# Patient Record
Sex: FEMALE | Race: WHITE | URBAN - METROPOLITAN AREA
[De-identification: names, ages, dates, MRNs, and addresses within clinical notes are randomized per-mention and may not be internally consistent; named-entity substitution may affect disease eponyms.]

---

## 2018-09-11 ENCOUNTER — TRANSFERRED RECORDS (OUTPATIENT)
Dept: HEALTH INFORMATION MANAGEMENT | Facility: CLINIC | Age: 28
End: 2018-09-11

## 2018-10-10 ENCOUNTER — OFFICE VISIT (OUTPATIENT)
Dept: PEDIATRICS | Facility: CLINIC | Age: 28
End: 2018-10-10
Attending: GENETIC COUNSELOR, MS
Payer: MEDICAID

## 2018-10-10 ENCOUNTER — OFFICE VISIT (OUTPATIENT)
Dept: PEDIATRICS | Facility: CLINIC | Age: 28
End: 2018-10-10
Attending: MEDICAL GENETICS
Payer: MEDICAID

## 2018-10-10 ENCOUNTER — ALLIED HEALTH/NURSE VISIT (OUTPATIENT)
Dept: PEDIATRICS | Facility: CLINIC | Age: 28
End: 2018-10-10
Attending: DIETITIAN, REGISTERED
Payer: MEDICAID

## 2018-10-10 VITALS
DIASTOLIC BLOOD PRESSURE: 94 MMHG | HEART RATE: 112 BPM | HEIGHT: 58 IN | WEIGHT: 198.19 LBS | SYSTOLIC BLOOD PRESSURE: 120 MMHG | BODY MASS INDEX: 41.6 KG/M2

## 2018-10-10 DIAGNOSIS — F79 INTELLECTUAL DISABILITY: ICD-10-CM

## 2018-10-10 DIAGNOSIS — H35.9 RETINAL DEGENERATION: ICD-10-CM

## 2018-10-10 DIAGNOSIS — H35.00 RETINOPATHY: ICD-10-CM

## 2018-10-10 DIAGNOSIS — F79 INTELLECTUAL DISABILITY: Primary | ICD-10-CM

## 2018-10-10 DIAGNOSIS — Q89.7 DYSMORPHIC FEATURES: ICD-10-CM

## 2018-10-10 DIAGNOSIS — R62.50 DEVELOPMENTAL DELAY: ICD-10-CM

## 2018-10-10 DIAGNOSIS — Z71.83 ENCOUNTER FOR NONPROCREATIVE GENETIC COUNSELING: ICD-10-CM

## 2018-10-10 DIAGNOSIS — E70.1 HYPERPHENYLALANINEMIA (H): ICD-10-CM

## 2018-10-10 DIAGNOSIS — R47.01 NONVERBAL: ICD-10-CM

## 2018-10-10 LAB
BASOPHILS # BLD AUTO: 0 10E9/L (ref 0–0.2)
BASOPHILS NFR BLD AUTO: 0.3 %
DIFFERENTIAL METHOD BLD: ABNORMAL
EOSINOPHIL # BLD AUTO: 0 10E9/L (ref 0–0.7)
EOSINOPHIL NFR BLD AUTO: 1.2 %
ERYTHROCYTE [DISTWIDTH] IN BLOOD BY AUTOMATED COUNT: 12.8 % (ref 10–15)
HCT VFR BLD AUTO: 40.1 % (ref 35–47)
HGB BLD-MCNC: 13.4 G/DL (ref 11.7–15.7)
IMM GRANULOCYTES # BLD: 0 10E9/L (ref 0–0.4)
IMM GRANULOCYTES NFR BLD: 0 %
LYMPHOCYTES # BLD AUTO: 2.2 10E9/L (ref 0.8–5.3)
LYMPHOCYTES NFR BLD AUTO: 65.3 %
MCH RBC QN AUTO: 30.7 PG (ref 26.5–33)
MCHC RBC AUTO-ENTMCNC: 33.4 G/DL (ref 31.5–36.5)
MCV RBC AUTO: 92 FL (ref 78–100)
MONOCYTES # BLD AUTO: 0.4 10E9/L (ref 0–1.3)
MONOCYTES NFR BLD AUTO: 10.6 %
NEUTROPHILS # BLD AUTO: 0.8 10E9/L (ref 1.6–8.3)
NEUTROPHILS NFR BLD AUTO: 22.6 %
NRBC # BLD AUTO: 0 10*3/UL
NRBC BLD AUTO-RTO: 0 /100
PLATELET # BLD AUTO: 285 10E9/L (ref 150–450)
PREALB SERPL IA-MCNC: 19 MG/DL (ref 15–45)
RBC # BLD AUTO: 4.37 10E12/L (ref 3.8–5.2)
TRANSFERRIN SERPL-MCNC: 221 MG/DL (ref 210–360)
WBC # BLD AUTO: 3.4 10E9/L (ref 4–11)

## 2018-10-10 PROCEDURE — 81408 MOPATH PROCEDURE LEVEL 9: CPT | Performed by: MEDICAL GENETICS

## 2018-10-10 PROCEDURE — 96040 ZZH GENETIC COUNSELING, EACH 30 MINUTES: CPT | Mod: ZF | Performed by: GENETIC COUNSELOR, MS

## 2018-10-10 PROCEDURE — G0463 HOSPITAL OUTPT CLINIC VISIT: HCPCS | Mod: ZF

## 2018-10-10 PROCEDURE — 40000803 ZZHCL STATISTIC DNA ISOL HIGH PURITY: Performed by: MEDICAL GENETICS

## 2018-10-10 PROCEDURE — 36415 COLL VENOUS BLD VENIPUNCTURE: CPT | Performed by: MEDICAL GENETICS

## 2018-10-10 PROCEDURE — 81229 CYTOG ALYS CHRML ABNR SNPCGH: CPT | Performed by: MEDICAL GENETICS

## 2018-10-10 PROCEDURE — 88230 TISSUE CULTURE LYMPHOCYTE: CPT | Performed by: MEDICAL GENETICS

## 2018-10-10 PROCEDURE — 82139 AMINO ACIDS QUAN 6 OR MORE: CPT | Performed by: MEDICAL GENETICS

## 2018-10-10 PROCEDURE — 84466 ASSAY OF TRANSFERRIN: CPT | Performed by: MEDICAL GENETICS

## 2018-10-10 PROCEDURE — 81406 MOPATH PROCEDURE LEVEL 7: CPT | Performed by: MEDICAL GENETICS

## 2018-10-10 PROCEDURE — 85025 COMPLETE CBC W/AUTO DIFF WBC: CPT | Performed by: MEDICAL GENETICS

## 2018-10-10 PROCEDURE — 88261 CHROMOSOME ANALYSIS 5: CPT | Performed by: MEDICAL GENETICS

## 2018-10-10 PROCEDURE — 84134 ASSAY OF PREALBUMIN: CPT | Performed by: MEDICAL GENETICS

## 2018-10-10 PROCEDURE — 81479 UNLISTED MOLECULAR PATHOLOGY: CPT | Performed by: MEDICAL GENETICS

## 2018-10-10 ASSESSMENT — PAIN SCALES - GENERAL: PAINLEVEL: NO PAIN (0)

## 2018-10-10 NOTE — NURSING NOTE
"[facility] [[id number]]  Chief Complaint   Patient presents with     Consult     new     Initial BP (!) 120/94  Pulse 112  Ht 4' 10.47\" (148.5 cm)  Wt 198 lb 3.1 oz (89.9 kg)  HC 50 cm (19.69\")  BMI 40.77 kg/m2 Estimated body mass index is 40.77 kg/(m^2) as calculated from the following:    Height as of this encounter: 4' 10.47\" (148.5 cm).    Weight as of this encounter: 198 lb 3.1 oz (89.9 kg).  Medication Reconciliation: complete    Peter Franks LPN    "

## 2018-10-10 NOTE — PROGRESS NOTES
Metabolism Clinic New Patient Visit  Name:  Miya Reyna  :   1990  MRN:   [id number]  Date of Visit: Oct 10, 2018     Reason for consultation:  Ms. Miya Reyna, a 27 year old woman, was seen in the Metabolism Clinic for evaluation of an elevated blood phenylalanine level..  Ms. Reyna was accompanied to this visit by her mother, sister, and group home staff. She also saw our dietitian and genetic counselor at this visit.      Assessment:    Although Miya had a modestly elevated blood phenylalanine level ascertained, the timing of this with regard to fasting was not known. The modest elevation is consistent with blood levels that could be observed in a heterozygote after a meal. It is not consistent with classical phenylketonuria. With this in mind, further assessment to more firmly establish if abnormal phenylalanine metabolism is present is appropriate. Also, based on her long-standing condition that includes short stature, significant retinopathy, and adult onset obesity, further assessment for a possible underlying diagnosis is clearly warranted. Her intellectual disability and other challenges are highly unlikely to be due to differences in phenylalanine metabolism. The strict limitation in phenylalanine intake should be discontinued as this is deleterious in the setting of normal or near normal phenylalanine metabolism as it risks significant limitations in both phenylalanine and tyrosine intake necessary for good health.    Plan:    1 At this visit, I recommended that we re-assess her phenylalanine status at this point. In addition, because it has been many years since any genetic testing had been undertaken, we chica a chromosome microarray as a first step towards trying to establish a more definitive diagnosis.  We also obtained a blood sample to assess her phenylalanine hydroxylase gene status.  Orders Placed This Encounter   Procedures     DIET CONSULT COMPREHENSIVE     Transferrin      "Amino acids plasma quantitative     Prealbumin     CBC with platelets differential     Phenylalanine and tyrosine level: Genetics and Metabolism External Order     GENETIC COUNSELING SERVICES       Results are as noted, below. Additional recommendations based on these results:   Blood phenylalanine level is normal in the fasting state in her current dietary regimen. Protein sufficiency looked adequate based on the plasma protein profile and other amino acid. Blood counts were normal. Based on the blood level, we will continue her modest protein restriction and recheck blood phenylalanine level in two weeks.    2. Medications:  No medications are prescribed related to this visit   3. Metabolic dietician consultation with Miladys Bates RD, LD today to review special dietary concerns. They discussed:  ---  Nutrition Education:    Provided education on goals for nutrition/treatment plan.     Patient being assessed for PKU; MD does not feel likely.  Per MD okay to take off PKU diet but goal not to exceed 1 g/kg protein.  -reviewed this goal with family and .  Stop PKU formula and reintroduce regular diet.  Did advise to keep cow's milk intake to ~16 oz/day and not give second portions of meats/meat-containing entrees.  Also continue increased servings/portions of fruits and vegetables to keep fiber intake up.  -reviewed normal PHE level, treatment range for PKU, and untreated ranges for PKU levels, explaining that \"elevated\" PHE of 104 was not very significant was not cause for a diagnosis/low protein diet implementation  -group home/family stated understanding of plan and reasoning   ---  4. Genetic counseling consultation with Aubree Maria MS, [facility] at this visit to obtain a pedigree and for genetic counseling regarding genetic testing and chromosome micro array.  5. Return to the Adult Metabolism Clinic in for follow-up depending on the results of testing     ________________  History of Present " "Illness:  Patient Active Problem List   Diagnosis     Hyperphenylalaninemia (H)     Retinopathy     Intellectual disability      Miya had been previously seen by our genetics team many years ago. Her paper medical record was not available at the time of this visit.  Her mother recalled that she had had a chromosome test that was not diagnostic. She had significant retinopathy that was followed by Dr. Muse. An underlying diagnosis that causes her problem was not established. In the years since she was assessed, the major health impact that has been observed is weight gain. Her mother indicates she's gained about 10 pounds per year although she was relatively thin until about age 12-13    Last year, her mother received a \"23 and Me\" kit as she thought this would be interesting to learn more about. 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 In reading about the symptoms of classical PKU, she became concerned that these resembled some features present for Miya. With this in mind, she requested that a blood phenylalanine level be done. This showed an elevated blood phenylalanine of 104 (upper limits of normal 85 nMo/mL). She concluded that Miya must have phenylketonuria as a cause for her problem and requested initiation of a low Phe diet. She contacted the [place] Department [employer] to see if the  screening results from Miya could be obtained but unfortunately these had been destroyed by court order as a result of a lawsuit against the state [facility]. Miya's  chart indicated that a sample was obtained for  screening, but did not report the results.  She also contacted us to determine if we had done blood phenylalanine levels on Miya during her initial evaluation.   the group home has been working to administer the phenylalanine " "restricted diet. This has proved challenging, although chronic constipation is somewhat improved with an increased vegetable intake.  Review of available medical records:  No additional records received.  Stored paper chart requested  Pertinent prior lab results:  Blood phenylalanine level done at [facility] showed phenylalanine 104 nMol/mL  Pertinent prior imaging results: none available for review  Past Medical History:   mother indicates that Miya was born at term as a healthy baby. At about [age]ants of age, she seemed \"too quiet\" and it was evident that development would be delayed. She learned to walk at about age 3. She has remade nonverbal.  Hospitalization History: None in last five years  Surgical History: none in last five years  Other health services currently received are primary care and ophthalmology .     Nutrition History  Patient is currently on a PKU/low protein diet.     Patient is here with worker from [facility], mother, and sister.  Recently patient had amino acids drawn at another facility and resulted in a slightly elevated PHE level of 104 nmol/mL (1.7 mg/dL). At the advice of that facility, she was started on a PKU diet with the help of a local RD in [place].  Family brought current menus and picture of PKU formula that patient has been taking.  Appears to be eating around 300-400 mg/day PHE.      PKU FORMULA  Phenylfree-2 (108 grams/day or 7.5 scoops)     This is providing 443 kcals/day (7 kcal/kg), 24 grams protein equivalent (0.5 g/kg using IBW; total protein 0.7 g/kg/day).       Review of Systems:  Unusual body odor: no  Unusual rashes/skin problems:  no  Unusual hair findings/alopecia:  no  Unusual periods of lethargy/somnolence: no  Constitional: negative  Eyes:  Long-standing history of retinal degeneration that has been thought to be congenital  Ears/Nose/Throat: negative - normal hearing  Respiratory: negative  Cardiovascular: negative  Gastrointestinal:  History of chronic " "constipation improved with increased vegetable intake  Genitourinary: negative  Hematologic/Lymphatic: negative  Allergy/Immunologic: negative - no drug allergies  Musculoskeletal:  History of recurrent knee dislocation  Endocrine: negative  Integument:  Does not have a history of eczema, has normally textured skin.  Neurologic: negative  Psychiatric: negative    The remainder of the comprehensive review of systems is complete and negative.    Family History:    A detailed pedigree was obtained and scanned into the electronic medical record.  It is significant for the following:     Miya is the second of two daughters her parents have together.  She has a 30-year-old sister who is healthy except for hypertension.      Miya additionally has a 22-year-old maternal half-sister who is healthy.  This woman has one healthy son.    Miya's mother Marva is 53-years-old and healthy.    Marva has a maternal cousin with a diagnosis of Padmaja syndrome.  She has reportedly not had genetic testing.      Miya's father is 53-years-old and has hypertension.      Miya has several paternal cousins with cystic fibrosis.      Miya is of [place], [place], and [place] ancestry on her maternal side and [place] and [place] ancestry on her paternal side.  Consanguinity was denied.     Social History:  Legal guardian: Legal parent(s)  Lives in stable group home situation.  Current insurance status state/federal program (Medicaid/Medicare).     I have reviewed Miya s past medical history, family history, social history, medications and allergies as documented in the electronic medical record.  There were no additional findings except as noted.    Medications:  No current outpatient prescriptions on file.     Supplements:   No additional supplements are administered     Allergies:  Not on File    Physical Examination:  Blood pressure (!) 120/94, pulse 112, height 4' 10.47\" (148.5 cm), weight 198 lb 3.1 oz (89.9 kg), head circumference 50 cm " "(19.69\").  89.9 kg (actual weight)  Body surface area is 1.93 meters squared.  General: This was a petite, nonverbal woman who was apprehensive about being examined   Head and Neck:  She had hair of normal texture and distribution and her head was proportionate in appearance.    Eyes:  The pupils were equal, round, and reacted to light.   The conjunctivae were clear.   Ears:  Her ears were normal in architecture and placement.   Nose: The nose was clear.    Mouth and Throat: The dentition was normal.  The throat was without erythema.    Respiratory: The chest was clear to auscultation and had a symmetric appearance.    CV:  On examination of the heart, the rhythm was regular and there was no murmur.   GI: The abdomen was soft and had normal bowel sounds.  There was no hepatosplenomegaly.    : I deferred a  examination.   Deferred much of the rest of exam due to limited cooperation    Results of laboratory studies collected at this visit and available when this note was completed:   Results for orders placed or performed in visit on 10/10/18   Transferrin   Result Value Ref Range    Transferrin 221 210 - 360 mg/dL   Amino acids plasma quantitative   Result Value Ref Range    A-Aminoadipic Negative 0 - 6 umol/dL    Alanine 26 22 - 62 umol/dL    Anserine Negative umol/dL    Arginine 6 2 - 18 umol/dL    Asparagine 4 1 - 5 umol/dL    Aspartic Acid <1 0 - 4 umol/dL    B-Alanine Plasma Negative umol/dL    B-Aminoisobutyric Negative umol/dL    Carnosine Negative umol/dL    Citrulline 2.5 1.3 - 6.0 umol/dL    Cystathionine Negative umol/dL    Cystine 13 3 - 15 umol/dL    Glutamic Acid 9 0 - 16 umol/dL    Glutamine 48 41 - 86 umol/dL    Glycine 24 13 - 50 umol/dL    Histidine 6 3 - 15 umol/dL    1-Methylhistidine Negative 0 - 2 umol/dL    3-Methylhistidine <1 0 - 1 umol/dL    Homocysteine umol/dL Negative umol/dL    Hydroxylysine Negative umol/dL    Hydroxyproline Negative 0 - 3 umol/dL    Isoleucine 5 4 - 11 umol/dL    " Leucine 9 8 - 21 umol/dL    Lysine 13 6 - 26 umol/dL    Methionine 2 1 - 6 umol/dL    Ornithine 4 2 - 16 umol/dL    Phenylalanine umol/dL 8 3 - 10 umol/dL    Proline 13 0 - 48 umol/dL    Sarcosine Plasma Negative umol/dL    Serine 11 4 - 18 umol/dL    Taurine 6 (L) 7 - 32 umol/dL    Threonine 11 5 - 25 umol/dL    Tyrosine 4 4 - 13 umol/dL    Valine 16 8 - 46 umol/dL   Prealbumin   Result Value Ref Range    Prealbumin 19 15 - 45 mg/dL   CBC with platelets differential   Result Value Ref Range    WBC 3.4 (L) 4.0 - 11.0 10e9/L    RBC Count 4.37 3.8 - 5.2 10e12/L    Hemoglobin 13.4 11.7 - 15.7 g/dL    Hematocrit 40.1 35.0 - 47.0 %    MCV 92 78 - 100 fl    MCH 30.7 26.5 - 33.0 pg    MCHC 33.4 31.5 - 36.5 g/dL    RDW 12.8 10.0 - 15.0 %    Platelet Count 285 150 - 450 10e9/L    Diff Method Automated Method     % Neutrophils 22.6 %    % Lymphocytes 65.3 %    % Monocytes 10.6 %    % Eosinophils 1.2 %    % Basophils 0.3 %    % Immature Granulocytes 0.0 %    Nucleated RBCs 0 0 /100    Absolute Neutrophil 0.8 (L) 1.6 - 8.3 10e9/L    Absolute Lymphocytes 2.2 0.8 - 5.3 10e9/L    Absolute Monocytes 0.4 0.0 - 1.3 10e9/L    Absolute Eosinophils 0.0 0.0 - 0.7 10e9/L    Absolute Basophils 0.0 0.0 - 0.2 10e9/L    Abs Immature Granulocytes 0.0 0 - 0.4 10e9/L    Absolute Nucleated RBC 0.0        DIANE FOSTER M.D.  Professor and Director   Division of Genetics and Metabolism  Department of Pediatrics  [facility]    [place] [facility]

## 2018-10-10 NOTE — MR AVS SNAPSHOT
MRN:[id number]                      After Visit Summary   10/10/2018    Miya Reyna    MRN: [id number]           Visit Information        Provider Department      10/10/2018 10:30 AM Miladys Bates RD Peds Metabolism        MyChart Information     [employer] is an electronic gateway that provides easy, online access to your medical records. With [employer], you can request a clinic appointment, read your test results, renew a prescription or communicate with your care team.     To sign up for [employer] visit the website at [email or web address]   You will be asked to enter the access code listed below, as well as some personal information. Please follow the directions to create your username and password.     Your access code is: [email or web address]  Expires: 2019 11:23 AM     Your access code will  in 90 days. If you need help or a new code, please contact your [facility] or call [phone] for assistance.        Care EveryWhere ID     This is your Care EveryWhere ID. This could be used by other organizations to access your [facility] medical records  [id number]        Equal Access to Services     [facility]OL[facility]: Hadhitesh Castillo, wabrian pimentel, theodore neely, usman caro . So [facility] [phone].    ATENCIÓN: Si habla español, tiene a beaulieu disposición servicios gratuitos de asistencia lingüística. Llame al [phone].    We comply with applicable federal civil rights laws and [place] laws. We do not discriminate on the basis of race, color, national origin, age, disability, sex, sexual orientation, or gender identity.

## 2018-10-10 NOTE — MR AVS SNAPSHOT
After Visit Summary   10/10/2018    Miya Reyna    MRN: [id number]           Patient Information     Date Of Birth          1990        Visit Information        Provider Department      10/10/2018 9:30 AM Magdalena Parikh MD Peds Metabolism        Today's Diagnoses     Hyperphenylalaninemia (H)        Retinopathy        Intellectual disability          Care Instructions    Metabolism Clinic     Call if any general care questions arise - contact our nurse coordinator Aimee Dupree at [phone] or send a [employer] message.                    Follow-ups after your visit        Additional Services     GENETIC COUNSELING SERVICES       GENETICS COUNSELING SERVICES ASSOCIATED WITH THIS ENCOUNTER.                  Follow-up notes from your care team     Return depending on results of testing.      Who to contact     Please call your clinic at [phone] to:    Ask questions about your health    Make or cancel appointments    Discuss your medicines    Learn about your test results    Speak to your doctor            Additional Information About Your Visit        MyChart Information     [employer] is an electronic gateway that provides easy, online access to your medical records. With [employer], you can request a clinic appointment, read your test results, renew a prescription or communicate with your care team.     To sign up for [employer] visit the website at [email or web address]   You will be asked to enter the access code listed below, as well as some personal information. Please follow the directions to create your username and password.     Your access code is: [email or web address]  Expires: 2019 11:23 AM     Your access code will  in 90 days. If you need help or a new code, please contact your [facility] or call [phone] for assistance.        Care EveryWhere ID     This is your Care EveryWhere ID. This could be used by other organizations to access your  "[facility] medical records  [id number]        Your Vitals Were     Pulse Height Head Circumference BMI (Body Mass Index)          112 4' 10.47\" (148.5 cm) 50 cm (19.69\") 40.77 kg/m2         Blood Pressure from Last 3 Encounters:   10/10/18 (!) 120/94    Weight from Last 3 Encounters:   10/10/18 198 lb 3.1 oz (89.9 kg)              We Performed the Following     DIET CONSULT COMPREHENSIVE     GENETIC COUNSELING SERVICES     Phenylalanine and tyrosine level: Genetics and Metabolism External Order        Primary Care Provider    None Specified       No primary provider on file.        Equal Access to Services     [facility]: Hadhitesh Castillo, jesus pimentel, theodore neely, usman caro . So [facility] [phone].    ATENCIÓN: Si habla español, tiene a beaulieu disposición servicios gratuitos de asistencia lingüística. Llame al [phone].    We comply with applicable federal civil rights laws and [place] laws. We do not discriminate on the basis of race, color, national origin, age, disability, sex, sexual orientation, or gender identity.            Thank you!     Thank you for choosing PEDS METABOLISM  for your care. Our goal is always to provide you with excellent care. Hearing back from our patients is one way we can continue to improve our services. Please take a few minutes to complete the written survey that you may receive in the mail after your visit with us. Thank you!             Your Updated Medication List - Protect others around you: Learn how to safely use, store and throw away your medicines at www.disposemymeds.org.      Notice  As of 10/10/2018 11:32 AM    You have not been prescribed any medications.      "

## 2018-10-10 NOTE — PROGRESS NOTES
"Presenting Information:  Miya Reyna is a 27-year-old woman with intellectual disability, retinal degeneration, and dysmorphic features.  There is recent family concern that her underlying diagnosis may be phenylketonuria (PKU).  She was seen at the [facility] today for evaluation by Dr. Magdalena Parikh.  I met with the family at the request of Dr. Parikh to obtain a personal and family history, discuss the genetics and inheritance of PKU, and to consent the family for genetic testing.      Personal History:  Miya was born at term after a healthy pregnancy.  She was healthy in the  period and had a presumably normal  screen, although records for this are not available.  She has congenital retinal degeneration.  Around 4-months of age, her mother reports her getting \"quieter\" and Miya failed to meet her early developmental milestones.  She first walked independently around age 3.  Miya is nonverbal but is able to understand some conversation around her.  iMya's mother Marva pursued 23 and Me and was found to be a carrier for phenylketonuria (PKU) with a single copy of the R408W mutation.  This prompted concern that perhaps Miya's underlying diagnosis may be PKU.  A phenylalanine level was found to be mildly elevated at 104 nmol/mL (normal range 35-85).  Since that time the family has put Miya on a low protein diet supplemented with PKU formula.      Family History:  A detailed pedigree was obtained and scanned into the electronic medical record.  It is significant for the following:     Miya is the second of two daughters her parents have together.  She has a 30-year-old sister who is healthy except for hypertension.      Miya additionally has a 22-year-old maternal half-sister who is healthy.  This woman has one healthy son.    Miya's mother Marva is 53-years-old and healthy.    Marva has a maternal cousin with a diagnosis of Padmaja syndrome.  She " has reportedly not had genetic testing.      Miya's father is 53-years-old and has hypertension.      Miya has several paternal cousins with cystic fibrosis.      Miya is of Occ[place], [place], and Polish ancestry on her maternal side and Occitan and Polish ancestry on her paternal side.  Consanguinity was denied.      Discussion:  Today we reviewed that genes are long stretches of DNA that are responsible for how our bodies look and how our bodies work.  We all have two copies of each gene; one inherited from the mother and one inherited from the father.  Our genes are inherited on structures called chromosomes.  When there is a change, called a mutation, in the DNA sequence of a gene or within a chromosome it can cause the signs and symptoms of a genetic condition.     PKU is a genetic condition caused by mutations in a gene called PAH.  The PAH gene normally helps break-down phenylalanine.  Mutations therefore disrupt this process, causing phenylalanine to build-up.  When untreated, this causes the signs and symptoms of PKU including intellectual disability.  PKU is inherited in an autosomal recessive pattern, meaning a patient must have a mutation in both copies of the PAH gene (one from each parent) to be affected.     After evaluation, we think Miya is unlikely to have PKU.  First, this condition is on the  screen.  While babies can be missed, Miya should have been screened for this at birth.   Secondly, Miya's phe level, while technically above the normal range, is much lower than would be expected to be seen in an untreated PKU patient.  Finally, Miya's additional features of retinal degeneration and dysmorphic features are likely not explained by PKU.      To help rule out PKU, a phenylalanine/tyrosine level was collected today.  However, because Miya has been on a low phe diet, this will likely be low regardless.  Therefore we will also plan to pursue genetic testing of the PAH gene to more  definitively rule-out this diagnosis.  If normal, Miya's low phe diet should be discontinued, as over-restriction of protein can have its own health hazards.      Assuming PKU is not the correct diagnosis for Miya, determining what is her correct diagnosis is important for several reasons.  First and foremost, this is critical for her own health.  It is possible that an underlying cause may also predispose Miya to other health risks.  Knowing about these additional health risks can help us stay ahead of her healthcare to more appropriately screen for other complications.  Some diagnoses may also have treatment options.  Secondly, discovering an underlying reason may help predict the chance for other family members, namely Miya's sisters, to have a child with similar healthcare needs.  Finally, having a specific underlying diagnosis can sometimes help individuals receive the services they need to help reach their full potential and receive appropriate support and care.      Our first recommendation for additional testing is a detailed chromosome study through karyotype and a chromosomal microarray.  This is a detailed look at the chromosomes to identify any missing or extra pieces of genetic material (aCGH) as well as areas of the chromosomes too similar to one another (SNP).  This is considered standard of care for any patient with developmental delay, intellectual disability, and/or dysmorphic features.  We discussed the benefits, limitations, and potential costs of genetic testing including the possibility of a positive, negative, or uncertain result.  The family provided written informed consent for the chromosomal microarray and genetic testing of the PAH gene.      If the above testing fails to identify Miya's underlying diagnosis, additional genetic testing will be recommended.  Likely the next best and most cost effective diagnostic step would be whole exome sequencing.  The nature of this test was  reviewed briefly today, and can be discussed again at a future visit as needed.  To aid in further diagnosis, pictures were taken of Miya today with the family's written permission.     It was a pleasure meeting with Miya and her family again today.  My contact information was shared should they have additional questions or concerns.     Plan:  1.  Phe/tyr level to be collected today  2.  A prior authorization for genetic testing will be submitted  3.  Once approved, genetic testing of the PAH gene  4.  Karyotype and chromosomal microarray (aCGH and SNP)  5.  I will contact the family when results return  6.  Follow-up as determined by the results of the above testing and as recommended by Dr. Jl Mak [facility]  Genetic Counselor  Division of Genetics and Metabolism    Total time spent in consultation with the family was approximately 45 minutes

## 2018-10-10 NOTE — PATIENT INSTRUCTIONS
Metabolism Clinic     Call if any general care questions arise - contact our nurse coordinator Aimee Dupree at [phone] or send a Reve[employer] message.

## 2018-10-10 NOTE — LETTER
"  10/10/2018      RE: Miya Reyna  [address] [place] [address]       Presenting Information:  Miya Reyna is a 27-year-old woman with intellectual disability, retinal degeneration, and dysmorphic features.  There is recent family concern that her underlying diagnosis may be phenylketonuria (PKU).  She was seen at the [facility] today for evaluation by Dr. Magdalena Parikh.  I met with the family at the request of Dr. Parikh to obtain a personal and family history, discuss the genetics and inheritance of PKU, and to consent the family for genetic testing.      Personal History:  Miya was born at term after a healthy pregnancy.  She was healthy in the  period and had a presumably normal  screen, although records for this are not available.  She has congenital retinal degeneration.  Around 4-months of age, her mother reports her getting \"quieter\" and Miya failed to meet her early developmental milestones.  She first walked independently around age 3.  Miya is nonverbal but is able to understand some conversation around her.  Miya's mother Marva pursued [employer] and Me and was found to be a carrier for phenylketonuria (PKU) with a single copy of the R408W mutation.  This prompted concern that perhaps Miya's underlying diagnosis may be PKU.  A phenylalanine level was found to be mildly elevated at 104 nmol/mL (normal range 35-85).  Since that time the family has put Miya on a low protein diet supplemented with PKU formula.      Family History:  A detailed pedigree was obtained and scanned into the electronic medical record.  It is significant for the following:     Miya is the second of two daughters her parents have together.  She has a 30-year-old sister who is healthy except for hypertension.      Miya additionally has a 22-year-old maternal half-sister who is healthy.  This woman has one healthy son.    Miya's mother Marva is 53-years-old and " healthy.    Marva has a maternal cousin with a diagnosis of Padmaja syndrome.  She has reportedly not had genetic testing.      Miya's father is 53-years-old and has hypertension.      Miya has several paternal cousins with cystic fibrosis.      Miya is of Brazilian, [place]alese, and Polish ancestry on her maternal side and Brazilian and Polish ancestry on her paternal side.  Consanguinity was denied.      Discussion:  Today we reviewed that genes are long stretches of DNA that are responsible for how our bodies look and how our bodies work.  We all have two copies of each gene; one inherited from the mother and one inherited from the father.  Our genes are inherited on structures called chromosomes.  When there is a change, called a mutation, in the DNA sequence of a gene or within a chromosome it can cause the signs and symptoms of a genetic condition.     PKU is a genetic condition caused by mutations in a gene called PAH.  The PAH gene normally helps break-down phenylalanine.  Mutations therefore disrupt this process, causing phenylalanine to build-up.  When untreated, this causes the signs and symptoms of PKU including intellectual disability.  PKU is inherited in an autosomal recessive pattern, meaning a patient must have a mutation in both copies of the PAH gene (one from each parent) to be affected.     After evaluation, we think Miya is unlikely to have PKU.  First, this condition is on the  screen.  While babies can be missed, Miya should have been screened for this at birth.   Secondly, Miya's phe level, while technically above the normal range, is much lower than would be expected to be seen in an untreated PKU patient.  Finally, Miya's additional features of retinal degeneration and dysmorphic features are likely not explained by PKU.      To help rule out PKU, a phenylalanine/tyrosine level was collected today.  However, because Miya has been on a low phe diet, this will likely be low regardless.   Therefore we will also plan to pursue genetic testing of the PAH gene to more definitively rule-out this diagnosis.  If normal, Miya's low phe diet should be discontinued, as over-restriction of protein can have its own health hazards.      Assuming PKU is not the correct diagnosis for Miya, determining what is her correct diagnosis is important for several reasons.  First and foremost, this is critical for her own health.  It is possible that an underlying cause may also predispose Miya to other health risks.  Knowing about these additional health risks can help us stay ahead of her healthcare to more appropriately screen for other complications.  Some diagnoses may also have treatment options.  Secondly, discovering an underlying reason may help predict the chance for other family members, namely Miya's sisters, to have a child with similar healthcare needs.  Finally, having a specific underlying diagnosis can sometimes help individuals receive the services they need to help reach their full potential and receive appropriate support and care.      Our first recommendation for additional testing is a detailed chromosome study through karyotype and a chromosomal microarray.  This is a detailed look at the chromosomes to identify any missing or extra pieces of genetic material (aCGH) as well as areas of the chromosomes too similar to one another (SNP).  This is considered standard of care for any patient with developmental delay, intellectual disability, and/or dysmorphic features.  We discussed the benefits, limitations, and potential costs of genetic testing including the possibility of a positive, negative, or uncertain result.  The family provided written informed consent for the chromosomal microarray and genetic testing of the PAH gene.      If the above testing fails to identify Miya's underlying diagnosis, additional genetic testing will be recommended.  Likely the next best and most cost effective  diagnostic step would be whole exome sequencing.  The nature of this test was reviewed briefly today, and can be discussed again at a future visit as needed.  To aid in further diagnosis, pictures were taken of Miya today with the family's written permission.     It was a pleasure meeting with Miya and her family again today.  My contact information was shared should they have additional questions or concerns.     Plan:  1.  Phe/tyr level to be collected today  2.  A prior authorization for genetic testing will be submitted  3.  Once approved, genetic testing of the PAH gene  4.  Karyotype and chromosomal microarray (aCGH and SNP)  5.  I will contact the family when results return  6.  Follow-up as determined by the results of the above testing and as recommended by Dr. Jl Maria [facility]  Genetic Counselor  Division of Genetics and Metabolism    Total time spent in consultation with the family was approximately 45 minutes        Aubree Maria GC

## 2018-10-10 NOTE — MR AVS SNAPSHOT
After Visit Summary   10/10/2018    Miya Reyna    MRN: [id number]           Patient Information     Date Of Birth          1990        Visit Information        Provider Department      10/10/2018 9:30 AM Aubree Maria GC Peds Metabolism        Today's Diagnoses     Intellectual disability    -  1    Developmental delay        Nonverbal        Dysmorphic features        Retinal degeneration        Hyperphenylalaninemia (H)        Encounter for nonprocreative genetic counseling        Retinopathy           Follow-ups after your visit        Who to contact     Please call your clinic at [phone] to:    Ask questions about your health    Make or cancel appointments    Discuss your medicines    Learn about your test results    Speak to your doctor            Additional Information About Your Visit        MyChart Information     [employer] is an electronic gateway that provides easy, online access to your medical records. With [employer], you can request a clinic appointment, read your test results, renew a prescription or communicate with your care team.     To sign up for [employer] visit the website at [email or web address]   You will be asked to enter the access code listed below, as well as some personal information. Please follow the directions to create your username and password.     Your access code is: [email or web address]  Expires: 2019 11:23 AM     Your access code will  in 90 days. If you need help or a new code, please contact your [facility] or call [phone] for assistance.        Care EveryWhere ID     This is your Care EveryWhere ID. This could be used by other organizations to access your [place] medical records  [id number]         Blood Pressure from Last 3 Encounters:   10/10/18 (!) 120/94    Weight from Last 3 Encounters:   10/10/18 198 lb 3.1 oz (89.9 kg)              We Performed the Following     Amino acids plasma quantitative      CBC with platelets differential     CGH SNP ARRAY with G-bands     [employer] Hold For Preauthorization: CUSTOM NGS; PAH; [id number], [id number]     Prealbumin     Transferrin        Primary Care Provider    None Specified       No primary provider on file.        Equal Access to Services     MARU RANDOLPH : Hadii aad ku hadjaycobnancy Edwigemerrill, oliviada luqadaha, theodore kaabigailda florinda, usman betzyin hayaaeliel mazariegossamijoana buckner. So [facility] [phone].    ATENCIÓN: Si habla español, tiene a beaulieu disposición servicios gratuitos de asistencia lingüística. Llame al [phone].    We comply with applicable federal civil rights laws and [place] laws. We do not discriminate on the basis of race, color, national origin, age, disability, sex, sexual orientation, or gender identity.            Thank you!     Thank you for choosing [facility]  for your care. Our goal is always to provide you with excellent care. Hearing back from our patients is one way we can continue to improve our services. Please take a few minutes to complete the written survey that you may receive in the mail after your visit with us. Thank you!             Your Updated Medication List - Protect others around you: Learn how to safely use, store and throw away your medicines at www.disposemymeds.org.      Notice  As of 10/10/2018 11:52 AM    You have not been prescribed any medications.

## 2018-10-10 NOTE — LETTER
10/10/2018      RE: Miya Reyna  [address] [place] [address]          Metabolism Clinic New Patient Visit  Name:  Miya Reyna  :   1990  MRN:   [id number]  Date of Visit: Oct 10, 2018     Reason for consultation:  Ms. Miya Reyna, a 27 year old woman, was seen in the Metabolism Clinic for evaluation of an elevated blood phenylalanine level..  Ms. Reyna was accompanied to this visit by her mother, sister, and group home staff. She also saw our dietitian and genetic counselor at this visit.      Assessment:    Although Miya had a modestly elevated blood phenylalanine level ascertained, the timing of this with regard to fasting was not known. The modest elevation is consistent with blood levels that could be observed in a heterozygote after a meal. It is not consistent with classical phenylketonuria. With this in mind, further assessment to more firmly establish if abnormal phenylalanine metabolism is present is appropriate. Also, based on her long-standing condition that includes short stature, significant retinopathy, and adult onset obesity, further assessment for a possible underlying diagnosis is clearly warranted. Her intellectual disability and other challenges are highly unlikely to be due to differences in phenylalanine metabolism. The strict limitation in phenylalanine intake should be discontinued as this is deleterious in the setting of normal or near normal phenylalanine metabolism as it risks significant limitations in both phenylalanine and tyrosine intake necessary for good health.    Plan:    1 At this visit, I recommended that we re-assess her phenylalanine status at this point. In addition, because it has been many years since any genetic testing had been undertaken, we chica a chromosome microarray as a first step towards trying to establish a more definitive diagnosis.  We also obtained a blood sample to assess her phenylalanine hydroxylase gene status.  Orders Placed  "This Encounter   Procedures     DIET CONSULT COMPREHENSIVE     Transferrin     Amino acids plasma quantitative     Prealbumin     CBC with platelets differential     Phenylalanine and tyrosine level: Genetics and Metabolism External Order     GENETIC COUNSELING SERVICES       Results are as noted, below. Additional recommendations based on these results:   Blood phenylalanine level is normal in the fasting state in her current dietary regimen. Protein sufficiency looked adequate based on the plasma protein profile and other amino acid. Blood counts were normal. Based on the blood level, we will continue her modest protein restriction and recheck blood phenylalanine level in two weeks.    2. Medications:  No medications are prescribed related to this visit   3. Metabolic dietician consultation with Miladys Bates RD, LD today to review special dietary concerns. They discussed:  ---  Nutrition Education:    Provided education on goals for nutrition/treatment plan.     Patient being assessed for PKU; MD does not feel likely.  Per MD okay to take off PKU diet but goal not to exceed 1 g/kg protein.  -reviewed this goal with family and .  Stop PKU formula and reintroduce regular diet.  Did advise to keep cow's milk intake to ~16 oz/day and not give second portions of meats/meat-containing entrees.  Also continue increased servings/portions of fruits and vegetables to keep fiber intake up.  -reviewed normal PHE level, treatment range for PKU, and untreated ranges for PKU levels, explaining that \"elevated\" PHE of 104 was not very significant was not cause for a diagnosis/low protein diet implementation  -group home/family stated understanding of plan and reasoning   ---  4. Genetic counseling consultation with Aubree Maria, MS, C at this visit to obtain a pedigree and for genetic counseling regarding genetic testing and chromosome micro array.  5. Return to the Adult Metabolism Clinic in for follow-up " "depending on the results of testing     ________________  History of Present Illness:  Patient Active Problem List   Diagnosis     Hyperphenylalaninemia (H)     Retinopathy     Intellectual disability      Miya had been previously seen by our genetics team many years ago. Her paper medical record was not available at the time of this visit.  Her mother recalled that she had had a chromosome test that was not diagnostic. She had significant retinopathy that was followed by Dr. Muse. An underlying diagnosis that causes her problem was not established. In the years since she was assessed, the major health impact that has been observed is weight gain. Her mother indicates she's gained about 10 pounds per year although she was relatively thin until about age 12-13    Last year, her mother received a \"23 and Me\" kit as she thought this would be interesting to learn more about. 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 In reading about the symptoms of classical PKU, she became concerned that these resembled some features present for Miya. With this in mind, she requested that a blood phenylalanine level be done. This showed an elevated blood phenylalanine of 104 (upper limits of normal 85 nMo/mL). She concluded that Miya must have phenylketonuria as a cause for her problem and requested initiation of a low Phe diet. She contacted the [place] [facility] to see if the  screening results from Miya could be obtained but unfortunately these had been destroyed by court order as a result of a lawsuit against the state [facility]. Miya's  chart indicated that a sample was obtained for  screening, but did not report the results.  She also contacted us to determine if we had done blood phenylalanine levels on Miya during her initial " "evaluation.   the [facility] has been working to administer the phenylalanine restricted diet. This has proved challenging, although chronic constipation is somewhat improved with an increased vegetable intake.  Review of available medical records:  No additional records received.  Stored paper chart requested  Pertinent prior lab results:  Blood phenylalanine level done at [facility] showed phenylalanine 104 nMol/mL  Pertinent prior imaging results: none available for review  Past Medical History:   mother indicates that Miya was born at term as a healthy baby. At about [age]ants of age, she seemed \"too quiet\" and it was evident that development would be delayed. She learned to walk at about age 3. She has remade nonverbal.  Hospitalization History: None in last five years  Surgical History: none in last five years  Other health services currently received are primary care and ophthalmology .     Nutrition History  Patient is currently on a PKU/low protein diet.     Patient is here with worker from [facility], mother, and sister.  Recently patient had amino acids drawn at another facility and resulted in a slightly elevated PHE level of 104 nmol/mL (1.7 mg/dL). At the advice of that facility, she was started on a PKU diet with the help of a local RD in [place].  Family brought current menus and picture of PKU formula that patient has been taking.  Appears to be eating around 300-400 mg/day PHE.      PKU FORMULA  Phenylfree-2 (108 grams/day or 7.5 scoops)     This is providing 443 kcals/day (7 kcal/kg), 24 grams protein equivalent (0.5 g/kg using IBW; total protein 0.7 g/kg/day).       Review of Systems:  Unusual body odor: no  Unusual rashes/skin problems:  no  Unusual hair findings/alopecia:  no  Unusual periods of lethargy/somnolence: no  Constitional: negative  Eyes:  Long-standing history of retinal degeneration that has been thought to be congenital  Ears/Nose/Throat: negative - normal hearing  Respiratory: " negative  Cardiovascular: negative  Gastrointestinal:  History of chronic constipation improved with increased vegetable intake  Genitourinary: negative  Hematologic/Lymphatic: negative  Allergy/Immunologic: negative - no drug allergies  Musculoskeletal:  History of recurrent knee dislocation  Endocrine: negative  Integument:  Does not have a history of eczema, has normally textured skin.  Neurologic: negative  Psychiatric: negative    The remainder of the comprehensive review of systems is complete and negative.    Family History:    A detailed pedigree was obtained and scanned into the electronic medical record.  It is significant for the following:     Miya is the second of two daughters her parents have together.  She has a 30-year-old sister who is healthy except for hypertension.      Miya additionally has a 22-year-old maternal half-sister who is healthy.  This woman has one healthy son.    Miya's mother Marva is 53-years-old and healthy.    Marva has a maternal cousin with a diagnosis of Padmaja syndrome.  She has reportedly not had genetic testing.      Miya's father is 53-years-old and has hypertension.      Miya has several paternal cousins with cystic fibrosis.      Miya is of [place] citizen, [place], and [place] ancestry on her maternal side and [place] citizen and [place] ancestry on her paternal side.  Consanguinity was denied.     Social History:  Legal guardian: Legal parent(s)  Lives in stable group home situation.  Current insurance status state/federal program (Medicaid/Medicare).     I have reviewed Miya s past medical history, family history, social history, medications and allergies as documented in the electronic medical record.  There were no additional findings except as noted.    Medications:  No current outpatient prescriptions on file.     Supplements:   No additional supplements are administered     Allergies:  Not on File    Physical Examination:  Blood pressure (!) 120/94, pulse 112, height 4'  "10.47\" (148.5 cm), weight 198 lb 3.1 oz (89.9 kg), head circumference 50 cm (19.69\").  89.9 kg (actual weight)  Body surface area is 1.93 meters squared.  General: This was a petite, nonverbal woman who was apprehensive about being examined   Head and Neck:  She had hair of normal texture and distribution and her head was proportionate in appearance.    Eyes:  The pupils were equal, round, and reacted to light.   The conjunctivae were clear.   Ears:  Her ears were normal in architecture and placement.   Nose: The nose was clear.    Mouth and Throat: The dentition was normal.  The throat was without erythema.    Respiratory: The chest was clear to auscultation and had a symmetric appearance.    CV:  On examination of the heart, the rhythm was regular and there was no murmur.   GI: The abdomen was soft and had normal bowel sounds.  There was no hepatosplenomegaly.    : I deferred a  examination.   Deferred much of the rest of exam due to limited cooperation    Results of laboratory studies collected at this visit and available when this note was completed:   Results for orders placed or performed in visit on 10/10/18   Transferrin   Result Value Ref Range    Transferrin 221 210 - 360 mg/dL   Amino acids plasma quantitative   Result Value Ref Range    A-Aminoadipic Negative 0 - 6 umol/dL    Alanine 26 22 - 62 umol/dL    Anserine Negative umol/dL    Arginine 6 2 - 18 umol/dL    Asparagine 4 1 - 5 umol/dL    Aspartic Acid <1 0 - 4 umol/dL    B-Alanine Plasma Negative umol/dL    B-Aminoisobutyric Negative umol/dL    Carnosine Negative umol/dL    Citrulline 2.5 1.3 - 6.0 umol/dL    Cystathionine Negative umol/dL    Cystine 13 3 - 15 umol/dL    Glutamic Acid 9 0 - 16 umol/dL    Glutamine 48 41 - 86 umol/dL    Glycine 24 13 - 50 umol/dL    Histidine 6 3 - 15 umol/dL    1-Methylhistidine Negative 0 - 2 umol/dL    3-Methylhistidine <1 0 - 1 umol/dL    Homocysteine umol/dL Negative umol/dL    Hydroxylysine Negative umol/dL "    Hydroxyproline Negative 0 - 3 umol/dL    Isoleucine 5 4 - 11 umol/dL    Leucine 9 8 - 21 umol/dL    Lysine 13 6 - 26 umol/dL    Methionine 2 1 - 6 umol/dL    Ornithine 4 2 - 16 umol/dL    Phenylalanine umol/dL 8 3 - 10 umol/dL    Proline 13 0 - 48 umol/dL    Sarcosine Plasma Negative umol/dL    Serine 11 4 - 18 umol/dL    Taurine 6 (L) 7 - 32 umol/dL    Threonine 11 5 - 25 umol/dL    Tyrosine 4 4 - 13 umol/dL    Valine 16 8 - 46 umol/dL   Prealbumin   Result Value Ref Range    Prealbumin 19 15 - 45 mg/dL   CBC with platelets differential   Result Value Ref Range    WBC 3.4 (L) 4.0 - 11.0 10e9/L    RBC Count 4.37 3.8 - 5.2 10e12/L    Hemoglobin 13.4 11.7 - 15.7 g/dL    Hematocrit 40.1 35.0 - 47.0 %    MCV 92 78 - 100 fl    MCH 30.7 26.5 - 33.0 pg    MCHC 33.4 31.5 - 36.5 g/dL    RDW 12.8 10.0 - 15.0 %    Platelet Count 285 150 - 450 10e9/L    Diff Method Automated Method     % Neutrophils 22.6 %    % Lymphocytes 65.3 %    % Monocytes 10.6 %    % Eosinophils 1.2 %    % Basophils 0.3 %    % Immature Granulocytes 0.0 %    Nucleated RBCs 0 0 /100    Absolute Neutrophil 0.8 (L) 1.6 - 8.3 10e9/L    Absolute Lymphocytes 2.2 0.8 - 5.3 10e9/L    Absolute Monocytes 0.4 0.0 - 1.3 10e9/L    Absolute Eosinophils 0.0 0.0 - 0.7 10e9/L    Absolute Basophils 0.0 0.0 - 0.2 10e9/L    Abs Immature Granulocytes 0.0 0 - 0.4 10e9/L    Absolute Nucleated RBC 0.0        DIANE FOSTER M.D.  Professor and Director   Division of Genetics and Metabolism  Department of Pediatrics  [facility]    Routed to patient in [facility]

## 2018-10-11 LAB
(HCYS)2 SERPL-SCNC: NEGATIVE UMOL/DL
1ME-HIST SERPL-SCNC: NEGATIVE UMOL/DL (ref 0–2)
3ME-HISTIDINE SERPL-SCNC: <1 UMOL/DL (ref 0–1)
AAA SERPL-SCNC: NEGATIVE UMOL/DL (ref 0–6)
ALANINE SERPL-SCNC: NEGATIVE UMOL/DL
ALANINE SFR SERPL: 26 UMOL/DL (ref 22–62)
ANSERINE SERPL-SCNC: NEGATIVE UMOL/DL
ARGININE SERPL-SCNC: 6 UMOL/DL (ref 2–18)
ASPARAGINE SERPL-SCNC: 4 UMOL/DL (ref 1–5)
ASPARTATE SERPL-SCNC: <1 UMOL/DL (ref 0–4)
B-AIB SERPL-SCNC: NEGATIVE UMOL/DL
CARNOSINE SERPL-SCNC: NEGATIVE UMOL/DL
CITRULLINE SERPL-SCNC: 2.5 UMOL/DL (ref 1.3–6)
CYSTATHIONIN SERPL-SCNC: NEGATIVE UMOL/DL
CYSTINE SERPL-SCNC: 13 UMOL/DL (ref 3–15)
GLUTAMATE SERPL-SCNC: 48 UMOL/DL (ref 41–86)
GLUTAMATE SERPL-SCNC: 9 UMOL/DL (ref 0–16)
GLYCINE SERPL-SCNC: 24 UMOL/DL (ref 13–50)
HISTIDINE SERPL-SCNC: 6 UMOL/DL (ref 3–15)
ISOLEUCINE SERPL-SCNC: 5 UMOL/DL (ref 4–11)
LEUCINE SERPL-SCNC: 9 UMOL/DL (ref 8–21)
LYSINE SERPL-SCNC: 13 UMOL/DL (ref 6–26)
METHIONINE SERPL-SCNC: 2 UMOL/DL (ref 1–6)
OH-LYSINE SERPL-SCNC: NEGATIVE UMOL/DL
OH-PROLINE SERPL-SCNC: NEGATIVE UMOL/DL (ref 0–3)
ORNITHINE SERPL-SCNC: 4 UMOL/DL (ref 2–16)
PHE SERPL-SCNC: 8 UMOL/DL (ref 3–10)
PROLINE SERPL-SCNC: 13 UMOL/DL (ref 0–48)
SARCOSINE SERPL-SCNC: NEGATIVE UMOL/DL
SERINE SERPL-SCNC: 11 UMOL/DL (ref 4–18)
TAURINE SERPL-SCNC: 6 UMOL/DL (ref 7–32)
THREONINE SERPL-SCNC: 11 UMOL/DL (ref 5–25)
TYROSINE SERPL-SCNC: 4 UMOL/DL (ref 4–13)
VALINE SERPL-SCNC: 16 UMOL/DL (ref 8–46)

## 2018-10-12 NOTE — PROGRESS NOTES
..CLINICAL NUTRITION SERVICES - PEDIATRIC ASSESSMENT NOTE    REASON FOR ASSESSMENT  Miya Reyna is a 27 year old female seen by the dietitian for consult regarding elevated phenylalanine levels.    ANTHROPOMETRICS  Height: 148.5 cm  Weight: 89.9  BMI: 40.8  Ideal body weight: 45 kg  Adjusted body weight: 68 kg    NUTRITION HISTORY  Patient is currently on a PKU/low protein diet.    Patient is here with worker from [facility], mother, and sister.  Recently patient had amino acids drawn at another facility and resulted in a slightly elevated PHE level of 104 nmol/mL (1.7 mg/dL). At the advice of that facility, she was started on a PKU diet with the help of a local RD in [place].  Family brought current menus and picture of PKU formula that patient has been taking.  Appears to be eating around 300-400 mg/day PHE.      PKU FORMULA  Phenylfree-2 (108 grams/day or 7.5 scoops)    This is providing 443 kcals/day (7 kcal/kg), 24 grams protein equivalent (0.5 g/kg using IBW; total protein 0.7 g/kg/day).      LABS  Labs reviewed    MEDICATIONS  Medications reviewed    ASSESSED NUTRITION NEEDS:  Estimated Energy Needs: 20-25 kcal/kg using ABW  Estimated Protein Needs: RDA = 0.8 g/kg, optimal range 0.8-1 g/kg  Micronutrient Needs: 600 international units Vitamin D    NUTRITION DIAGNOSIS:  No nutrition diagnosis at this time.    INTERVENTIONS  Nutrition Prescription  Meet 100% estimated nutrition needs through regular diet.    Nutrition Education:   Provided education on goals for nutrition/treatment plan.    Patient being assessed for PKU; MD does not feel likely.  Per MD okay to take off PKU diet but goal not to exceed 1 g/kg protein.  -reviewed this goal with family and .  Stop PKU formula and reintroduce regular diet.  Did advise to keep cow's milk intake to ~16 oz/day and not give second portions of meats/meat-containing entrees.  Also continue increased servings/portions of fruits and vegetables to  "keep fiber intake up.  -reviewed normal PHE level, treatment range for PKU, and untreated ranges for PKU levels, explaining that \"elevated\" PHE of 104 was not very significant was not cause for a diagnosis/low protein diet implementation  -group home/family stated understanding of plan and reasoning    Goals  1. Meet >85% estimated nutrition needs through regular diet    FOLLOW UP/MONITORING  Testing results    Time spent with patient: 15 minutes    "

## 2018-10-16 LAB — COPATH REPORT: NORMAL

## 2018-10-18 LAB — COPATH REPORT: NORMAL

## 2018-10-22 LAB — COPATH REPORT: NORMAL

## 2018-10-23 ENCOUNTER — TELEPHONE (OUTPATIENT)
Dept: PEDIATRICS | Facility: CLINIC | Age: 28
End: 2018-10-23

## 2018-10-23 DIAGNOSIS — Q89.7 DYSMORPHIC FEATURES: ICD-10-CM

## 2018-10-23 DIAGNOSIS — H35.9 RETINAL DEGENERATION: ICD-10-CM

## 2018-10-23 DIAGNOSIS — R47.01 NONVERBAL: ICD-10-CM

## 2018-10-23 DIAGNOSIS — R62.50 DEVELOPMENTAL DELAY: Primary | ICD-10-CM

## 2018-10-23 DIAGNOSIS — F79 INTELLECTUAL DISABILITY: ICD-10-CM

## 2018-10-23 NOTE — TELEPHONE ENCOUNTER
I contacted Miya's mother Marva to discuss the results of Miya's recent genetic testing.  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  Flores syndrome is associated with intellectual disability, dysmorphic features, neutropenia, retinal degeneration, and obesity.  It is therefore potentially a very good fit with Miya's symptoms.  We recommend sequence analysis of the opposite copy of the VPS13B gene to potentially identify a second mutations that would result in this diagnosis.  Marva expressed understanding and agreement with this plan.  We will still plan to also order genetic testing of the PAH gene.  Results are expected in approximately 6-8 weeks and I will contact the family again at that time.  We remain available for any additional questions or concerns in the meantime.       Aubree Maria MS [facility]  Genetic Counselor  Division of Genetics and Metabolism

## 2018-10-29 LAB
PHENYLALANINE: 103 NMOL/ML
TYROSINE: 63 NMOL/ML

## 2018-11-02 ENCOUNTER — DOCUMENTATION ONLY (OUTPATIENT)
Dept: ENDOCRINOLOGY | Facility: CLINIC | Age: 28
End: 2018-11-02

## 2018-11-30 ENCOUNTER — TELEPHONE (OUTPATIENT)
Dept: PEDIATRICS | Facility: CLINIC | Age: 28
End: 2018-11-30

## 2018-11-30 LAB — COPATH REPORT: NORMAL

## 2018-11-30 NOTE — TELEPHONE ENCOUNTER
I contacted Miya's mother Marva to discuss the results of Miya's recent genetic testing.  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  Sequencing of the VPS13B gene identified a likely pathogenic mutation: [id number] ([id number]).  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  We will plan to see the family back for further discussion of this diagnosis and implications for other family members.  I will have our  reach out to the family early next week to schedule this.        Aubree Maria MS [facility]  Genetic Counselor  Division of Genetics and Metabolism

## 2018-12-06 ENCOUNTER — TELEPHONE (OUTPATIENT)
Dept: PEDIATRICS | Facility: CLINIC | Age: 28
End: 2018-12-06

## 2018-12-06 NOTE — TELEPHONE ENCOUNTER
I received a call from Miya's mother Marva.  She is requesting that we send information documenting Miya's new diagnosis of Flores syndrome to Miya's group home.  This will be faxed at her request.      Aubree Maria MS [facility]  Genetic Counselor  Division of Genetics and Metabolism

## 2019-02-04 ENCOUNTER — OFFICE VISIT (OUTPATIENT)
Dept: CONSULT | Facility: CLINIC | Age: 29
End: 2019-02-04
Attending: MEDICAL GENETICS
Payer: MEDICAID

## 2019-02-04 ENCOUNTER — OFFICE VISIT (OUTPATIENT)
Dept: CONSULT | Facility: CLINIC | Age: 29
End: 2019-02-04
Attending: GENETIC COUNSELOR, MS
Payer: MEDICAID

## 2019-02-04 VITALS
HEART RATE: 100 BPM | DIASTOLIC BLOOD PRESSURE: 96 MMHG | SYSTOLIC BLOOD PRESSURE: 120 MMHG | WEIGHT: 195.11 LBS | BODY MASS INDEX: 39.33 KG/M2 | HEIGHT: 59 IN

## 2019-02-04 DIAGNOSIS — F79 INTELLECTUAL DISABILITY: ICD-10-CM

## 2019-02-04 DIAGNOSIS — Q87.89 COHEN SYNDROME: ICD-10-CM

## 2019-02-04 DIAGNOSIS — E66.9 OBESITY: ICD-10-CM

## 2019-02-04 DIAGNOSIS — Q87.89 COHEN SYNDROME: Primary | ICD-10-CM

## 2019-02-04 DIAGNOSIS — Z71.83 ENCOUNTER FOR NONPROCREATIVE GENETIC COUNSELING: ICD-10-CM

## 2019-02-04 DIAGNOSIS — H35.00 RETINOPATHY: ICD-10-CM

## 2019-02-04 DIAGNOSIS — D70.9 NEUTROPENIA, UNSPECIFIED TYPE (H): ICD-10-CM

## 2019-02-04 PROCEDURE — G0463 HOSPITAL OUTPT CLINIC VISIT: HCPCS | Mod: ZF

## 2019-02-04 PROCEDURE — 96040 ZZH GENETIC COUNSELING, EACH 30 MINUTES: CPT | Mod: ZF | Performed by: GENETIC COUNSELOR, MS

## 2019-02-04 RX ORDER — ADHESIVE BANDAGE 3/4"
BANDAGE TOPICAL
Refills: 5 | COMMUNITY
Start: 2019-01-14

## 2019-02-04 ASSESSMENT — MIFFLIN-ST. JEOR: SCORE: 1515.24

## 2019-02-04 NOTE — LETTER
Date:February 8, 2019      Patient was self referred, no letter generated. Do not send.        [facility] Information

## 2019-02-04 NOTE — NURSING NOTE
"Chief Complaint   Patient presents with     Follow Up     Flores Syndrome     BP (!) 120/96 (BP Location: Left arm, Patient Position: Sitting, Cuff Size: Adult Large)   Pulse 100   Ht 4' 10.98\" (149.8 cm)   Wt 195 lb 1.7 oz (88.5 kg)   BMI 39.44 kg/m      Radha Spicer CMA    "

## 2019-02-04 NOTE — PROGRESS NOTES
GENETICS CLINIC Follow-up    Name:  Miya Reyna  :   1990  MRN:   [id number]    Date of service: 2019    Reason for visit:  Miya, a 28 year old female, returned for ongoing evaluation of her complex medical condition and review of multiple tests that were undertaken to ascertain her diagnosis.  Miya was accompanied to this visit by her mother. She also saw our genetic counselor at this visit.       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    Flores syndrome is a rare, autosomal recessive condition with a distinctive craniofacial dysmorphic pattern, intellectual disability, and retinal dystrophy, often associated with myopia. Obesity is common, typically in mid child florian to adolescent onset. Joint laxity is a common feature. Miya has all of these clinical features.    Plan:    These analyses confirm that Miya does not require restrictions of phenylalanine in her diet.   Miya should continue to receive regular eye care because of risks of retinal detachment and progressive retinopathy   Because her condition can result in neutropenia, providers caring for her should be aware of a potential risk for susceptibility to infection  Ordered at this visit:  Orders Placed This Encounter   Procedures     GENETIC COUNSELING SERVICES     Genetic counseling consultation with Aubree Maria MS, [facility] for genetic counseling regarding genetic testing results and for advice regarding testing of family members.  We will provide her mother with a letter summarizing information for other family members who might desire testing related to these recessive states.   Return to the Genetics Clinic in for  follow-up as new questions or concerns arise.     -----  History of Present Illness:  Visit Diagnosis:  Flores syndrome    Patient Active Problem List   Diagnosis     Hyperphenylalaninemia (H)     Retinopathy     Intellectual disability     Last Genetic Clinic date:  10/10/18  Interval information discussed at this visit:  Following her testing in October, normal or near normal phenylalanine levels have been observed on normal diet. This was a relief to her family and care providers as following a phenylalanine restriction had become burdensome and was not particularly effective. Miya has remained medically healthy without intervening illnesses or issues.    The following reviews laboratory testing ascertained to reach a diagnosis for Miya. The initial findings of this were reported by telephone to her mom who has spent quite a bit of time in learning more about this underlying condition and who has found the information to be very meaningful in thinking about Miya and her health status. She indicated that this explained many questions that she had always had about Miya and is glad to have this additional information for her long term care.      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   NexGen sequencing showed a second pathogenic variant of this gene c.2889delG (p.[id number]) frameshift variant    This means she has two abnormal alleles for this gene. Based on this, and her clinical findings, this confirms the diagnosis of Flores syndrome     NexGen sequencing for the phenylalanine hydroxylase gene showed only one pathogenic variant (c.1222C>T (p.[id number])), a known genetic change that had also been observed in her mother's 23 and me assessment. 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    Review of available medical records interim  information:  Pertinent studies/abnormal test results:   TEST(S) REQUESTED:   CGH with SNP     SPECIMEN DESCRIPTION:   Peripheral Blood     CLINICAL COMMENTS:   Intellectual disability, developmental delay, nonverbal, dysmorphic   features, retinal degeneration.     MICROARRAY ANALYSIS:     RESULTS AND INTERPRETATION:     MICROARRAY ANALYSIS  OF COPY NUMBER :     Copy number LOSS in 8q22.2  (41 Kb) - autosomal recessive     ISCN:   arr[GRCh37] 8q22.2([id number])x1   The array revealed a copy number loss spanning approximately 41 Kb within   the long arm of chromosome 8, band   q22.2. Mapped to this region are several exons of VPS13B (the specific   exons depend on the specific isoform   under consideration).   Haploinsufficiency for VPS13B is not, by itself, pathogenic; however, a   deletion on one homolog, coupled with   a mutation of the homologous allele, is pathogenic, and has been   specifically associated with the clinical   findings of Flores syndrome.   Flores syndrome (OMIM 933577) is an autosomal recessive condition that is   associated with a variable   constellation of clinical findings that can include hypotonia, cognitive   disability, distinctive facial   features, short stature, truncal obesity, microcephaly, ocular   abnormalities including retinal dystrophy, and   various cardiac, endocrine, and skeletal findings.   Correlation with clinical findings in this patient is needed to determine   if sequencing of the VPS13B gene   should be recommended.   ===================================  TEST REQUESTED: Flores syndrome and Phenylketonuria. Next generation   sequencing of VPS13B and PAH.     RESULTS:                                             POSITIVE                   Pathogenic Variant(s):                                2   Detected                   Variant(s) of Uncertain Significance:             None   Detected     INTERPRETATION: Two clinically significant variants were detected in this    individual- one in the VPS13B gene,   and one in the PAH gene.   A heterozygous, likely pathogenic variant was detected in the VPS13B gene.    Mutations in this gene are   associated with autosomal recessive Flores syndrome. In the context of this    patient's past chromosome   microarray results which showed a 41 Kb deletion at 8q22.2 including   several exons of the VPS13B gene, this   result is consistent with a diagnosis of Flores syndrome for this patient.   This result explains this patient's   clinical findings of intellectual disability, retinoapthy, short stature   and obesity. Targeted parental   testing of these two variants may help further confim that these variants   are in the trans configuration (on   opposite alleles).     A heterozygous, pathogenic variant was detected in the PAH gene. Mutations    in this gene are associated with   autosomal recessive phenylketonuria (PKU). The finding of a single   pathogenic variant in this gene,   particularly in the context of only modestly elevated phenylalanine levels    in the blood in this patient, is   most likely consistent with carrier status for PKU.     Clinical correlation and genetic counseling regarding these results is   recommended.     VPS13B: [id number]; c.2889delG (p.[id number]), heterozygous, exon 20,    Likely Pathogenic     PAH: [id number]; c.1222C>T (p.Koo809Qgj), heterozygous, exon 12,   [id number], Pathogenic     The c.2889delG (p.[id number]) frameshift variant in VPS13B is   classified as likely pathogenic. This   variant is in exon 20 of the gene, which consists of 62 exons. While this   specific frameshift variant has not   been previously reported as pathogenic, other frameshift and loss of   function variants have been reported both   upstream and downstream of this variant. This is a rare variant which is   absent from the gnomAD database of   roughly 140,000 control individuals. Based on all available evidence, this     variant is interpreted as likely   pathogenic.     The c.1222C>T (p.Vgg851Iiu) PAH mutation is a well-documented PAH mutation    associated with classic PKU   phenotypes and is the most frequently identified PAH mutation in the   [place] accounting for 18.7% of PAH   alleles (Tawanaberg et al., 1996). This mutation has been demonstrated to   result in either complete loss of PAH   enzyme activity, or significantly diminished activity (Tameka et al.,   1987; biopku.org). This mutation has   been classified as pathogenic in the PAH mutation database   (www.pahdb.[facility].ca) and has been shown to   segregate with PKU phenotypes in multiple published individuals. Based on   all available evidence, this variant   is categorized as pathogenic.     BACKGROUND:   Flores syndrome is an autosomal recessive condition characterized by   childhood onset retinal dystrophy,   acquired microcephaly, hypotonia, joint hypermobility, progressive high   myopia, and global developmental delay   [8]. Other associated findings include short stature, small hands and   feet, truncal obesity, and neutropenia.   Characteristic facial features include thick hair and eyebrow, long   eyelashes, wave-shaped palpebral fissures,   bulbous nasal tip, and smooth philtrum [9]. For more information, please   reference the GeneReviews at   [facility]:[email or web address] :[email or web address].     Mutations in the PAH (phenylalanine hydroxylase) gene can lead to a   spectrum of metabolic phenotypes ranging   from classic phenylketonuria (PKU) to hyperphenylalaninemia.  Without   dietary intervention, classic PKU can   result in severe mental retardation.  PKU is inherited in an autosomal   recessive manner, with an average   incidence of 1/10,000 in the  population.     Interval history:  Hospitalization since last visit: none  Surgical procedures since last visit: none  Other health services currently received are primary care, dietitian and  ophthalmology     Review of Systems:  Constitional: weight gain in adult life  Eyes:  Long-standing history of retinal dystrophy and myopia is followed on a regular basis; seen every six months   Ears/Nose/Throat: negative - normal hearing  Respiratory: negative  Cardiovascular: negative  Gastrointestinal:  History of chronic constipation improved with increased vegetable intake  Genitourinary: negative  Hematologic/Lymphatic: negative; month indicates that she was frequently sick as a young child but has not handed frequent infections  Allergy/Immunologic: negative - no drug allergies  Musculoskeletal:  History of recurrent knee dislocation  Endocrine: negative  Integument:  Does not have a history of eczema, has normally textured skin.  Neurologic: negative  Psychiatric: intellectual disability, friendly, pleasant personality    Remainder of comprehensive review of systems is complete and negative.     Personal History  Family History:  I reviewed the family history.  There was no new family history information elicited on review at the time of this visit. .    Social History:  Legal guardian: parent  Lives in stable group home situation.  Current insurance status state/federal program (Medicaid/Medicare).     I have reviewed Miya s past medical history, family history, social history, medications and allergies as documented in the electronic medical record.  There were no additional findings except as noted.    Medications:  Current Outpatient Medications   Medication Sig Dispense Refill     carbamide peroxide (EAR DROPS EARWAX AID) 6.5 % otic solution Instill 5-10 drops to both ears once daily the first 5 days of each month.       Multiple Vitamins-Minerals (MULTIVITAMIN ADULT PO) Take 1 tablet by mouth       CVS FIBER GUMMIES PO TAKE 2 GUMMIES DAILY  3     EAR DROPS 6.5 % otic solution INSTILL 5-10 DROPS TO BOTH EARS ONCE DAILY THE FIRST 5 DAYS OF EACH MONTH.  5     Allergies:  Allergies   Allergen Reactions  "    Cefaclor Rash     Physical Examination:  Blood pressure (!) 120/96, pulse 100, height 4' 10.98\" (149.8 cm), weight 195 lb 1.7 oz (88.5 kg).  General: This was a petite, nonverbal, overweight, short woman who was apprehensive about being examined but who was otherwise smiling and pleasant  Head and Neck:  She had hair of normal texture and distribution and her head was proportionate in appearance.   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   The conjunctivae were clear.  She was wearing thick glasses  Ears:  Her ears were normal in architecture and placement.   Nose: The nose was clear.  She has a prominent nasal root  Mouth and Throat: The dentition was normal.  She has a short philtrum with a nasal tip that overhangs    Respiratory: The chest was clear to auscultation and had a symmetric appearance.    CV:  On examination of the heart, the rhythm was regular and there was no murmur.   GI: The abdomen was soft and had normal bowel sounds.  There was no hepatosplenomegaly.    : I deferred a  examination.   Musculoskeletal: relatively small hands and feet with long appearing fingers. There is notable distal hypermobility of small bones.  Deferred much of the rest of exam due to limited cooperation  -----------------  DIANE FOSTER M.D.  Professor and Director   Division of Genetics and Metabolism  Department of Pediatrics  [facility]    Routed to family in Comm Mgt    "

## 2019-02-04 NOTE — LETTER
2/4/2019      RE: Miya Reyna  [address]   [place] [address]       Presenting Information:  Miya Reyna is a 28-year-old woman with a new diagnosis of Flores syndrome.  She has been found to have an 8q22.2 deletion involving several exons of the VPS13B gene and a VPS13B sequence mutation: c.2889delG (p.[id number]). Miya was brought by her mother Marva to the [facility] for a follow-up visit with Dr. Magdalena Parikh.  I met with the family at the request of Dr. Parikh to discuss this diagnosis in detail.      Discussion:  We first reviewed that genes are long stretches of DNA that are responsible for how our bodies look and how our bodies work.  Our genes are inherited on structures called chromosomes of which we have 23 pairs.  Changes in the DNA sequence of a gene, called mutations, as well as changes within the chromosomes can cause the signs and symptoms of a genetic condition.      Because of Miya's symptoms, we were concerned she may have an underlying genetic condition.  Therefore at Miya's initial genetics visit we recommended a detailed look at Sorens chromosomes through a test called a chromosomal microarray.  This identified the small missing piece of the VPS13B gene on the 8th chromosome.  We then ordered sequencing of the VPS13B gene to look for any mutations in the DNA sequence of the gene which identified the c.2889delG (p.[id number]) mutation.  This confirmed a diagnosis of Flores syndrome for Miya.      Flores syndrome is caused by mutations in a gene called VPS13B (sometimes called the COH1 gene).  The VPS13B gene is thought to normally be important for a process called glycosylation.  Glycosylation is the process where sugar molecules are attached to proteins, which help the body know where the proteins should go.  Mutations disrupt this process, causing the signs and symptoms of Flores syndrome.      Signs and symptoms of Flores syndrome can vary  significantly between patients.  Most often symptoms include developmental delay, intellectual disability, low muscle tone, and flexible joints. This also includes characteristic facial features, increased risk for obesity, and small head size.  Flores syndrome also causes eye problems including nearsightedness and retinal dystrophy.  Individuals with Flores syndrome often have neutropenia (low white blood cells).  Another commonly reported sign is a happy and friendly personality.      Flores syndrome is inherited in an autosomal recessive pattern.  This means that to be affected an individual must inherit a mutation in both copies of the VPS13B gene (one from each parent).  Individuals with just one mutation in the VPS13B gene are said to be carriers.  Carriers do not have Flores syndrome but can have an affected child if their partner is also a carrier.  When both parents are carriers, with each pregnancy there is a 25% chance for the child to be affected.     Miya has a full sister and and a maternal half-sister.  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  Carrier testing would be available to both women if they wished to learn this information.  If they are found to be a carrier, their partner could then have testing to determine their chance to have a child with Flores syndrome.  Similarly, other extended family members also have a high chance to be carriers.  Any interested family member and their partners are encouraged to contact me directly to help facilitate this testing or refer them to a genetic counselor in their area.  Because of the additional family history of cystic fibrosis as well as the known carrier status for PKU (discussed below), more comprehensive carrier testing is available to family members to address multiple conditions.      Of note, at our initial visit the family was concerned Miya may have a different genetic  condition called phenylketonuria (PKU).  PKU causes high levels of an amino acid (a component of protein) called phenylalanine (phe).  This concern was due to Miya's mother Marva learning she is a carrier for this condition by [employer] and Me.  Miya then had a phe level drawn which was slightly elevated.  Despite this, we did not think Miya had PKU but to confirm this we did also sequence the gene for PKU called PAH.  This identified the same mutation found in her mother: c.1222C>T (p.[id number]).  This means Miya is a carrier for PKU but unaffected.  This is not expected to affect her health in any way.      It was a pleasure meeting with Miya and her mother today.  They are encouraged to contact us with any additional questions or concerns.      Plan:  1.  Carrier testing is available to family members if desired  2.  Follow-up as recommended by Dr. Jl Maria [facility]  Genetic Counselor  Division of Genetics and Metabolism    Total time spent in consultation with the family was approximately 20 minutes        Aubree Maria GC

## 2019-02-04 NOTE — LETTER
2019    RE: Miya Reyna  [address]  [place] [address]     GENETICS CLINIC Follow-up    Name:  Miya Reyna  :   1990  MRN:   [id number]    Date of service: 2019    Reason for visit:  Miya, a 28 year old female, returned for ongoing evaluation of her complex medical condition and review of multiple tests that were undertaken to ascertain her diagnosis.  Miya was accompanied to this visit by her mother. She also saw our genetic counselor at this visit.       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    Flores syndrome is a rare, autosomal recessive condition with a distinctive craniofacial dysmorphic pattern, intellectual disability, and retinal dystrophy, often associated with myopia. Obesity is common, typically in mid child florian to adolescent onset. Joint laxity is a common feature. Miya has all of these clinical features.    Plan:    These analyses confirm that Miya does not require restrictions of phenylalanine in her diet.   Miya should continue to receive regular eye care because of risks of retinal detachment and progressive retinopathy   Because her condition can result in neutropenia, providers caring for her should be aware of a potential risk for susceptibility to infection  Ordered at this visit:  Orders Placed This Encounter   Procedures     GENETIC COUNSELING SERVICES     Genetic [place] Aubree [place], [facility] [place] genetic testing results and for advice regarding testing of family members.  We will provide her mother with a letter summarizing information for other family members who might desire testing  related to these recessive states.   Return to the Genetics Clinic in for follow-up as new questions or concerns arise.     -----  History of Present Illness:  Visit Diagnosis:  Flores syndrome    Patient Active Problem List   Diagnosis     Hyperphenylalaninemia (H)     Retinopathy     Intellectual disability     Last Genetic Clinic date:  10/10/18  Interval information discussed at this visit:  Following her testing in October, normal or near normal phenylalanine levels have been observed on normal diet. This was a relief to her family and care providers as following a phenylalanine restriction had become burdensome and was not particularly effective. Miya has remained medically healthy without intervening illnesses or issues.    The following reviews laboratory testing ascertained to reach a diagnosis for Miya. The initial findings of this were reported by telephone to her mom who has spent quite a bit of time in learning more about this underlying condition and who has found the information to be very meaningful in thinking about Miya and her health status. She indicated that this explained many questions that she had always had about Miya and is glad to have this additional information for her long term care.      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   NexGen sequencing showed a second pathogenic variant of this gene c.2889delG (p.[id number]) frameshift variant    This means she has two abnormal alleles for this gene. Based on this, and her clinical findings, this confirms the diagnosis of Flores syndrome     NexGen sequencing for the phenylalanine hydroxylase gene showed only one pathogenic variant (c.1222C>T (p.Htt069Rqn)), a known genetic change that had also been observed in her mother's 23 and me assessment. 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    Review of available medical records interim information:  Pertinent studies/abnormal test results:   TEST(S) REQUESTED:   CGH with SNP     SPECIMEN DESCRIPTION:   Peripheral Blood     CLINICAL COMMENTS:   Intellectual disability, developmental delay, nonverbal, dysmorphic   features, retinal degeneration.     MICROARRAY ANALYSIS:     RESULTS AND INTERPRETATION:     MICROARRAY ANALYSIS  OF COPY NUMBER :     Copy number LOSS in 8q22.2  (41 Kb) - autosomal recessive     ISCN:   arr[GRCh37] 8q22.2([id number])x1   The array revealed a copy number loss spanning approximately 41 Kb within   the long arm of chromosome 8, band   q22.2. Mapped to this region are several exons of VPS13B (the specific   exons depend on the specific isoform   under consideration).   Haploinsufficiency for VPS13B is not, by itself, pathogenic; however, a   deletion on one homolog, coupled with   a mutation of the homologous allele, is pathogenic, and has been   specifically associated with the clinical   findings of Flores syndrome.   Flores syndrome (OMIM 859838) is an autosomal recessive condition that is   associated with a variable   constellation of clinical findings that can include hypotonia, cognitive   disability, distinctive facial   features, short stature, truncal obesity, microcephaly, ocular   abnormalities including retinal dystrophy, and   various cardiac, endocrine, and skeletal findings.   Correlation with clinical findings in this patient is needed to determine   if sequencing of the VPS13B gene   should be recommended.   ===================================  TEST REQUESTED: Flores syndrome and Phenylketonuria. Next generation   sequencing of VPS13B and PAH.     RESULTS:                                             POSITIVE                   Pathogenic Variant(s):                                2   Detected                   Variant(s) of Uncertain Significance:             None   Detected      INTERPRETATION: Two clinically significant variants were detected in this   individual- one in the VPS13B gene,   and one in the PAH gene.   A heterozygous, likely pathogenic variant was detected in the VPS13B gene.    Mutations in this gene are   associated with autosomal recessive Flores syndrome. In the context of this    patient's past chromosome   microarray results which showed a 41 Kb deletion at 8q22.2 including   several exons of the VPS13B gene, this   result is consistent with a diagnosis of Flores syndrome for this patient.   This result explains this patient's   clinical findings of intellectual disability, retinoapthy, short stature   and obesity. Targeted parental   testing of these two variants may help further confim that these variants   are in the trans configuration (on   opposite alleles).     A heterozygous, pathogenic variant was detected in the PAH gene. Mutations    in this gene are associated with   autosomal recessive phenylketonuria (PKU). The finding of a single   pathogenic variant in this gene,   particularly in the context of only modestly elevated phenylalanine levels    in the blood in this patient, is   most likely consistent with carrier status for PKU.     Clinical correlation and genetic counseling regarding these results is   recommended.     VPS13B: [id number]; c.2889delG (p.[id number]), heterozygous, exon 20,    Likely Pathogenic     PAH: [id number]; c.1222C>T (p.Zvn624Nvy), heterozygous, exon 12,   [id number], Pathogenic     The c.2889delG (p.[id number]) frameshift variant in VPS13B is   classified as likely pathogenic. This   variant is in exon 20 of the gene, which consists of 62 exons. While this   specific frameshift variant has not   been previously reported as pathogenic, other frameshift and loss of   function variants have been reported both   upstream and downstream of this variant. This is a rare variant which is   absent from the gnomAD database of    roughly 140,000 control individuals. Based on all available evidence, this    variant is interpreted as likely   pathogenic.     The c.1222C>T (p.Pmx797Ane) PAH mutation is a well-documented PAH mutation    associated with classic PKU   phenotypes and is the most frequently identified PAH mutation in the   [place] accounting for 18.7% of PAH   alleles (Tawanaberg et al., 1996). This mutation has been demonstrated to   result in either complete loss of PAH   enzyme activity, or significantly diminished activity (Tameka et al.,   1987; biopku.org). This mutation has   been classified as pathogenic in the PAH mutation database   (www.pahdb.[email or web address].ca) and has been shown to   segregate with PKU phenotypes in multiple published individuals. Based on   all available evidence, this variant   is categorized as pathogenic.     BACKGROUND:   Flores syndrome is an autosomal recessive condition characterized by   childhood onset retinal dystrophy,   acquired microcephaly, hypotonia, joint hypermobility, progressive high   myopia, and global developmental delay   [8]. Other associated findings include short stature, small hands and   feet, truncal obesity, and neutropenia.   Characteristic facial features include thick hair and eyebrow, long   eyelashes, wave-shaped palpebral fissures,   bulbous nasal tip, and smooth philtrum [9]. For more information, please   reference the GeneReviews at   [facility]:[email or web address] ://[email or web address].     Mutations in the PAH (phenylalanine hydroxylase) gene can lead to a   spectrum of metabolic phenotypes ranging   from classic phenylketonuria (PKU) to hyperphenylalaninemia.  Without   dietary intervention, classic PKU can   result in severe mental retardation.  PKU is inherited in an autosomal   recessive manner, with an average   incidence of 1/10,000 in the  population.     Interval history:  Hospitalization since last visit: none  Surgical procedures since last visit:  none  Other health services currently received are primary care, dietitian and ophthalmology     Review of Systems:  Constitional: weight gain in adult life  Eyes:  Long-standing history of retinal dystrophy and myopia is followed on a regular basis; seen every six months   Ears/Nose/Throat: negative - normal hearing  Respiratory: negative  Cardiovascular: negative  Gastrointestinal:  History of chronic constipation improved with increased vegetable intake  Genitourinary: negative  Hematologic/Lymphatic: negative; month indicates that she was frequently sick as a young child but has not handed frequent infections  Allergy/Immunologic: negative - no drug allergies  Musculoskeletal:  History of recurrent knee dislocation  Endocrine: negative  Integument:  Does not have a history of eczema, has normally textured skin.  Neurologic: negative  Psychiatric: intellectual disability, friendly, pleasant personality    Remainder of comprehensive review of systems is complete and negative.     Personal History  Family History:  I reviewed the family history.  There was no new family history information elicited on review at the time of this visit. .    Social History:  Legal guardian: parent  Lives in stable group home situation.  Current insurance status state/federal program (Medicaid/Medicare).     I have reviewed Miya s past medical history, family history, social history, medications and allergies as documented in the electronic medical record.  There were no additional findings except as noted.    Medications:  Current Outpatient Medications   Medication Sig Dispense Refill     carbamide peroxide (EAR DROPS EARWAX AID) 6.5 % otic solution Instill 5-10 drops to both ears once daily the first 5 days of each month.       Multiple Vitamins-Minerals (MULTIVITAMIN ADULT PO) Take 1 tablet by mouth       [employer] FIBER GUMMIES PO TAKE 2 GUMMIES DAILY  3     EAR DROPS 6.5 % otic solution INSTILL 5-10 DROPS TO BOTH EARS ONCE DAILY THE  "FIRST 5 DAYS OF EACH MONTH.  5     Allergies:  Allergies   Allergen Reactions     Cefaclor Rash     Physical Examination:  Blood pressure (!) 120/96, pulse 100, height 4' 10.98\" (149.8 cm), weight 195 lb 1.7 oz (88.5 kg).  General: This was a petite, nonverbal, overweight, short woman who was apprehensive about being examined but who was otherwise smiling and pleasant  Head and Neck:  She had hair of normal texture and distribution and her head was proportionate in appearance.   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   The conjunctivae were clear.  She was wearing thick glasses  Ears:  Her ears were normal in architecture and placement.   Nose: The nose was clear.  She has a prominent nasal root  Mouth and Throat: The dentition was normal.  She has a short philtrum with a nasal tip that overhangs    Respiratory: The chest was clear to auscultation and had a symmetric appearance.    CV:  On examination of the heart, the rhythm was regular and there was no murmur.   GI: The abdomen was soft and had normal bowel sounds.  There was no hepatosplenomegaly.    : I deferred a  examination.   Musculoskeletal: relatively small hands and feet with long appearing fingers. There is notable distal hypermobility of small bones.  Deferred much of the rest of exam due to limited cooperation  -----------------  DIANE FOSTER M.D.  Professor and Director   Division of Genetics and Metabolism  Department of Pediatrics  [facility]    Routed to family in Comm Mgt    "

## 2019-02-04 NOTE — PATIENT INSTRUCTIONS
Genetics  [facility]     Call if any general or medical questions arise - contact our nurse coordinator Aimee Dupree at [phone]    Scheduling: [phone]

## 2019-02-07 NOTE — PROGRESS NOTES
Presenting Information:  Miya Reyna is a 28-year-old woman with a new diagnosis of Flores syndrome.  She has been found to have an 8q22.2 deletion involving several exons of the VPS13B gene and a VPS13B sequence mutation: c.2889delG (p.[id number]). Miya was brought by her mother Marva to the [facility] for a follow-up visit with Dr. Magdalena Parikh.  I met with the family at the request of Dr. Parikh to discuss this diagnosis in detail.      Discussion:  We first reviewed that genes are long stretches of DNA that are responsible for how our bodies look and how our bodies work.  Our genes are inherited on structures called chromosomes of which we have 23 pairs.  Changes in the DNA sequence of a gene, called mutations, as well as changes within the chromosomes can cause the signs and symptoms of a genetic condition.      Because of Miya's symptoms, we were concerned she may have an underlying genetic condition.  Therefore at Miya's initial genetics visit we recommended a detailed look at Miya's chromosomes through a test called a chromosomal microarray.  This identified the small missing piece of the VPS13B gene on the 8th chromosome.  We then ordered sequencing of the VPS13B gene to look for any mutations in the DNA sequence of the gene which identified the c.2889delG (p.[id number]) mutation.  This confirmed a diagnosis of Flores syndrome for Miya.      Flores syndrome is caused by mutations in a gene called VPS13B (sometimes called the COH1 gene).  The VPS13B gene is thought to normally be important for a process called glycosylation.  Glycosylation is the process where sugar molecules are attached to proteins, which help the body know where the proteins should go.  Mutations disrupt this process, causing the signs and symptoms of Flores syndrome.      Signs and symptoms of Flores syndrome can vary significantly between patients.  Most often symptoms include developmental delay,  intellectual disability, low muscle tone, and flexible joints. This also includes characteristic facial features, increased risk for obesity, and small head size.  Flores syndrome also causes eye problems including nearsightedness and retinal dystrophy.  Individuals with Flores syndrome often have neutropenia (low white blood cells).  Another commonly reported sign is a happy and friendly personality.      Flores syndrome is inherited in an autosomal recessive pattern.  This means that to be affected an individual must inherit a mutation in both copies of the VPS13B gene (one from each parent).  Individuals with just one mutation in the VPS13B gene are said to be carriers.  Carriers do not have Flores syndrome but can have an affected child if their partner is also a carrier.  When both parents are carriers, with each pregnancy there is a 25% chance for the child to be affected.     Miya has a full sister and and a maternal half-sister.  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  Carrier testing would be available to both women if they wished to learn this information.  If they are found to be a carrier, their partner could then have testing to determine their chance to have a child with Flores syndrome.  Similarly, other extended family members also have a high chance to be carriers.  Any interested family member and their partners are encouraged to contact me directly to help facilitate this testing or refer them to a genetic counselor in their area.  Because of the additional family history of cystic fibrosis as well as the known carrier status for PKU (discussed below), more comprehensive carrier testing is available to family members to address multiple conditions.      Of note, at our initial visit the family was concerned Miya may have a different genetic condition called phenylketonuria (PKU).  PKU causes high levels of an amino acid (a  component of protein) called phenylalanine (phe).  This concern was due to Miya's mother Marva learning she is a carrier for this condition by [employer] and Me.  Miya then had a phe level drawn which was slightly elevated.  Despite this, we did not think Miya had PKU but to confirm this we did also sequence the gene for PKU called PAH.  This identified the same mutation found in her mother: c.1222C>T (p.[id number]).  This means Miya is a carrier for PKU but unaffected.  This is not expected to affect her health in any way.      It was a pleasure meeting with Miya and her mother today.  They are encouraged to contact us with any additional questions or concerns.      Plan:  1.  Carrier testing is available to family members if desired  2.  Follow-up [place] by Dr. Jl Mak [facility] [place]  Genetic Counselor  Division of Genetics and Metabolism    Total time spent in consultation with the family was approximately 20 minutes